# Patient Record
Sex: FEMALE | Race: BLACK OR AFRICAN AMERICAN | NOT HISPANIC OR LATINO | Employment: FULL TIME | URBAN - METROPOLITAN AREA
[De-identification: names, ages, dates, MRNs, and addresses within clinical notes are randomized per-mention and may not be internally consistent; named-entity substitution may affect disease eponyms.]

---

## 2024-05-30 ENCOUNTER — HOSPITAL ENCOUNTER (EMERGENCY)
Facility: HOSPITAL | Age: 34
Discharge: HOME | End: 2024-05-31
Attending: EMERGENCY MEDICINE
Payer: MEDICAID

## 2024-05-30 ENCOUNTER — APPOINTMENT (OUTPATIENT)
Dept: RADIOLOGY | Facility: HOSPITAL | Age: 34
End: 2024-05-30
Payer: MEDICAID

## 2024-05-30 DIAGNOSIS — E05.90 HYPERTHYROIDISM: Primary | ICD-10-CM

## 2024-05-30 LAB
ALBUMIN SERPL BCP-MCNC: 3.9 G/DL (ref 3.4–5)
ALP SERPL-CCNC: 54 U/L (ref 33–110)
ALT SERPL W P-5'-P-CCNC: 10 U/L (ref 7–45)
ANION GAP SERPL CALC-SCNC: 14 MMOL/L (ref 10–20)
AST SERPL W P-5'-P-CCNC: 26 U/L (ref 9–39)
BASOPHILS # BLD AUTO: 0.04 X10*3/UL (ref 0–0.1)
BASOPHILS NFR BLD AUTO: 0.7 %
BILIRUB SERPL-MCNC: 0.3 MG/DL (ref 0–1.2)
BUN SERPL-MCNC: 12 MG/DL (ref 6–23)
CALCIUM SERPL-MCNC: 9 MG/DL (ref 8.6–10.3)
CHLORIDE SERPL-SCNC: 104 MMOL/L (ref 98–107)
CO2 SERPL-SCNC: 21 MMOL/L (ref 21–32)
CREAT SERPL-MCNC: 0.73 MG/DL (ref 0.5–1.05)
D DIMER PPP FEU-MCNC: 430 NG/ML FEU
EGFRCR SERPLBLD CKD-EPI 2021: >90 ML/MIN/1.73M*2
EOSINOPHIL # BLD AUTO: 0.04 X10*3/UL (ref 0–0.7)
EOSINOPHIL NFR BLD AUTO: 0.7 %
ERYTHROCYTE [DISTWIDTH] IN BLOOD BY AUTOMATED COUNT: 18.3 % (ref 11.5–14.5)
GLUCOSE SERPL-MCNC: 78 MG/DL (ref 74–99)
HCG UR QL IA.RAPID: NEGATIVE
HCT VFR BLD AUTO: 36.1 % (ref 36–46)
HGB BLD-MCNC: 11.4 G/DL (ref 12–16)
IMM GRANULOCYTES # BLD AUTO: 0.01 X10*3/UL (ref 0–0.7)
IMM GRANULOCYTES NFR BLD AUTO: 0.2 % (ref 0–0.9)
LYMPHOCYTES # BLD AUTO: 2.04 X10*3/UL (ref 1.2–4.8)
LYMPHOCYTES NFR BLD AUTO: 37.6 %
MCH RBC QN AUTO: 24.3 PG (ref 26–34)
MCHC RBC AUTO-ENTMCNC: 31.6 G/DL (ref 32–36)
MCV RBC AUTO: 77 FL (ref 80–100)
MONOCYTES # BLD AUTO: 0.44 X10*3/UL (ref 0.1–1)
MONOCYTES NFR BLD AUTO: 8.1 %
NEUTROPHILS # BLD AUTO: 2.85 X10*3/UL (ref 1.2–7.7)
NEUTROPHILS NFR BLD AUTO: 52.7 %
NRBC BLD-RTO: 0 /100 WBCS (ref 0–0)
PLATELET # BLD AUTO: 351 X10*3/UL (ref 150–450)
POTASSIUM SERPL-SCNC: 4.4 MMOL/L (ref 3.5–5.3)
PROT SERPL-MCNC: 7.2 G/DL (ref 6.4–8.2)
RBC # BLD AUTO: 4.69 X10*6/UL (ref 4–5.2)
SARS-COV-2 RNA RESP QL NAA+PROBE: NOT DETECTED
SODIUM SERPL-SCNC: 135 MMOL/L (ref 136–145)
T4 FREE SERPL-MCNC: 1.46 NG/DL (ref 0.61–1.12)
TSH SERPL-ACNC: <0.01 MIU/L (ref 0.44–3.98)
WBC # BLD AUTO: 5.4 X10*3/UL (ref 4.4–11.3)

## 2024-05-30 PROCEDURE — 71046 X-RAY EXAM CHEST 2 VIEWS: CPT | Performed by: RADIOLOGY

## 2024-05-30 PROCEDURE — 85025 COMPLETE CBC W/AUTO DIFF WBC: CPT

## 2024-05-30 PROCEDURE — 85379 FIBRIN DEGRADATION QUANT: CPT

## 2024-05-30 PROCEDURE — 96361 HYDRATE IV INFUSION ADD-ON: CPT

## 2024-05-30 PROCEDURE — 99283 EMERGENCY DEPT VISIT LOW MDM: CPT | Mod: 25

## 2024-05-30 PROCEDURE — 81025 URINE PREGNANCY TEST: CPT

## 2024-05-30 PROCEDURE — 87635 SARS-COV-2 COVID-19 AMP PRB: CPT

## 2024-05-30 PROCEDURE — 96360 HYDRATION IV INFUSION INIT: CPT

## 2024-05-30 PROCEDURE — 80053 COMPREHEN METABOLIC PANEL: CPT

## 2024-05-30 PROCEDURE — 2500000004 HC RX 250 GENERAL PHARMACY W/ HCPCS (ALT 636 FOR OP/ED)

## 2024-05-30 PROCEDURE — 84439 ASSAY OF FREE THYROXINE: CPT

## 2024-05-30 PROCEDURE — 84443 ASSAY THYROID STIM HORMONE: CPT

## 2024-05-30 PROCEDURE — 84702 CHORIONIC GONADOTROPIN TEST: CPT

## 2024-05-30 PROCEDURE — 36415 COLL VENOUS BLD VENIPUNCTURE: CPT

## 2024-05-30 PROCEDURE — 71046 X-RAY EXAM CHEST 2 VIEWS: CPT

## 2024-05-30 RX ORDER — METHIMAZOLE 10 MG/1
5 TABLET ORAL DAILY
Qty: 30 TABLET | Refills: 0 | Status: SHIPPED | OUTPATIENT
Start: 2024-05-30 | End: 2024-11-26

## 2024-05-30 RX ORDER — METOPROLOL TARTRATE 25 MG/1
12.5 TABLET, FILM COATED ORAL 2 TIMES DAILY
Qty: 30 TABLET | Refills: 0 | Status: SHIPPED | OUTPATIENT
Start: 2024-05-30 | End: 2024-06-09

## 2024-05-30 RX ADMIN — SODIUM CHLORIDE, POTASSIUM CHLORIDE, SODIUM LACTATE AND CALCIUM CHLORIDE 1000 ML: 600; 310; 30; 20 INJECTION, SOLUTION INTRAVENOUS at 22:01

## 2024-05-30 ASSESSMENT — PAIN - FUNCTIONAL ASSESSMENT: PAIN_FUNCTIONAL_ASSESSMENT: 0-10

## 2024-05-30 ASSESSMENT — COLUMBIA-SUICIDE SEVERITY RATING SCALE - C-SSRS
2. HAVE YOU ACTUALLY HAD ANY THOUGHTS OF KILLING YOURSELF?: NO
1. IN THE PAST MONTH, HAVE YOU WISHED YOU WERE DEAD OR WISHED YOU COULD GO TO SLEEP AND NOT WAKE UP?: NO
6. HAVE YOU EVER DONE ANYTHING, STARTED TO DO ANYTHING, OR PREPARED TO DO ANYTHING TO END YOUR LIFE?: NO

## 2024-05-30 ASSESSMENT — PAIN DESCRIPTION - LOCATION: LOCATION: BACK

## 2024-05-30 ASSESSMENT — PAIN DESCRIPTION - ORIENTATION: ORIENTATION: LOWER

## 2024-05-30 ASSESSMENT — PAIN DESCRIPTION - PAIN TYPE: TYPE: CHRONIC PAIN

## 2024-05-30 ASSESSMENT — PAIN SCALES - GENERAL
PAINLEVEL_OUTOF10: 0 - NO PAIN
PAINLEVEL_OUTOF10: 3

## 2024-05-30 NOTE — LETTER
May 31, 2024    Patient: Meche Mcneil   YOB: 1990   Date of Visit: 5/30/2024       To Whom It May Concern:    Meche Mcneil was seen and treated in our emergency department on 5/30/2024. 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 If you have any questions or concerns, please don't hesitate to call.          Geovany Drake R.N.    CC:   No Recipients

## 2024-05-30 NOTE — ED TRIAGE NOTES
"Pt presents to ED for \"heart racing\" and fatigue since this morning. Pt denies CP, abd pain, N/V/D, lower back pain, urinary sx.   "

## 2024-05-31 VITALS
RESPIRATION RATE: 22 BRPM | HEIGHT: 70 IN | WEIGHT: 110 LBS | HEART RATE: 100 BPM | DIASTOLIC BLOOD PRESSURE: 72 MMHG | TEMPERATURE: 98.2 F | BODY MASS INDEX: 15.75 KG/M2 | OXYGEN SATURATION: 100 % | SYSTOLIC BLOOD PRESSURE: 113 MMHG

## 2024-05-31 LAB — B-HCG SERPL-ACNC: <2 MIU/ML

## 2024-05-31 NOTE — ED PROVIDER NOTES
CC: Palpitations and Fatigue     HPI: Meche Mcneil is an 33 y.o. female with history including anemia presenting to the emergency department for palpitations and fatigue. Patient reports that she began having palpitations that started this morning. She noticed that her heart rate was intermittently racing. She was recording a HR of  on her apple watch. She notes having fatigue that is worse then her baseline but is normally fatigued. She denies fevers chills, chest pain, shortness of breath.     Triage note was reviewed and  agree with it  Limitations to History:  none  Additional History Obtained from: reviewed patient's lab results that were on her phone with a TSH of 0.008     PMHx/PSHx:  Per HPI.   - has a past medical history of Other conditions influencing health status and Personal history of other diseases of the female genital tract.  - has a past surgical history that includes Mouth surgery (02/20/2014).    Social History:  - Tobacco:  has no history on file for tobacco use.   - Alcohol:  has no history on file for alcohol use.   - Drugs:  has no history on file for drug use.     Medications: Reviewed in EMR.     Allergies:  Trazodone    ???????????????????????????????????????????????????????????????  Triage Vitals:  T 36.8 °C (98.2 °F)  HR (!) 119  /75  RR 17  O2 98 % None (Room air)    Physical Exam  Vitals and nursing note reviewed.   Constitutional:       General: She is not in acute distress.  HENT:      Head: Normocephalic and atraumatic.   Eyes:      Conjunctiva/sclera: Conjunctivae normal.   Cardiovascular:      Rate and Rhythm: Regular rhythm. Tachycardia present.   Pulmonary:      Effort: Pulmonary effort is normal. No respiratory distress.      Breath sounds: Normal breath sounds.   Abdominal:      General: There is no distension.      Palpations: Abdomen is soft.   Musculoskeletal:         General: No deformity.      Cervical back: Normal range of motion.   Skin:     General:  Skin is warm and dry.   Neurological:      Mental Status: She is alert and oriented to person, place, and time.   Psychiatric:         Mood and Affect: Mood normal.         Behavior: Behavior normal.       ???????????????????????????????????????????????????????????????  EKG (per my independent interpretation): EKG was sinus rhythm at a tachycardic rate . Intervals was within normal limits.  No ST segment elevation.    ED Course/Medical Decision Making:    ED Course as of 05/31/24 2259   Thu May 30, 2024   2240 Thyroxine, Free(!): 1.46 [EMA]   2328 Heart Rate(!): 119 [EMA]   2329 Temperature: 36.8 °C (98.2 °F) [EMA]   2330 Heart Rate: 89 [EMA]      ED Course User Index  [EMA] Kiera Montelongo MD         Diagnoses as of 05/31/24 2259   Hyperthyroidism        Patient is a 33 year old female who is presenting with tachycardia.  Differential includes thyroid disease versus arrhythmia versus dehydration versus infection.  Low concern for PE but unable to apply PERC criteria because of the tachycardia.  D-dimer was negative.  Lab work was otherwise remarkable for hyperthyroidism.  Patient is not presenting in thyroid storm.  Patient was given a liter of fluid.  Heart rate improved.  Patient was discharged home with a beta blockers and  methimazole. Patient was discharged home in stable condition. Patient was advised to return if symptoms worsen or don’t improve. Patient was advised to follow up with their PCP as needed.       External records reviewed: recent inpatient, clinic, and prior ED notes  Diagnostic imaging independently reviewed/interpreted by me (as reflected in MDM) includes: labs, EKG, chest xray  Social Determinants Affecting Care:   Discussion of management with other providers: ED attending,    Prescription Drug Consideration: beta blocker, IVF, methimazole  Escalation of Care: none    Pt was seen and discussed with ED attending     Impression:   hyperthyroidism    Disposition: discharge        Procedures ?  SmartLinks last updated 5/30/2024 9:09 PM        Kiera Montelongo MD  Resident  05/31/24 [id number]

## 2024-06-10 PROBLEM — E05.90 HYPERTHYROIDISM: Status: ACTIVE | Noted: 2024-06-10

## 2024-06-10 PROBLEM — R00.2 PALPITATIONS: Status: ACTIVE | Noted: 2024-06-10

## 2024-06-10 NOTE — PROGRESS NOTES
Subjective   Patient ID:   Meche Mcneil is a 33 y.o. female who presents for No chief complaint on file..  HPI  New patient here today to establish care with myself.  Previous PCP:  Last seen:    ED follow up:  Was in the ED on 5/30/24.  ED note reviewed.  Was having palpitations and fatigue.  Was diagnosed with hyperthyroidism.  She was started on Methimazole and Metoprolol.  HR today is  Will be seeing endocrine in Aug 2024.    Health maintenance:  Smoking:  Labs: May 2024. DUE for lipid  Influenza:    Review of Systems  12 point review of systems negative unless stated above in HPI    There were no vitals filed for this visit.    Physical Exam  General: Alert and oriented, well nourished, no acute distress.  Lungs: Clear to auscultation, non-labored respiration.  Heart: Normal rate, regular rhythm, no murmur, gallop or edema.  Neurologic: Awake, alert, and oriented X3, CN II-XII intact.  Psychiatric: Cooperative, appropriate mood and affect.    Assessment/Plan   Diagnoses and all orders for this visit:  Hyperthyroidism  Palpitations

## 2024-06-11 ENCOUNTER — APPOINTMENT (OUTPATIENT)
Dept: PRIMARY CARE | Facility: CLINIC | Age: 34
End: 2024-06-11
Payer: MEDICAID

## 2024-06-11 DIAGNOSIS — R00.2 PALPITATIONS: ICD-10-CM

## 2024-06-11 DIAGNOSIS — E05.90 HYPERTHYROIDISM: Primary | ICD-10-CM

## 2024-06-12 ENCOUNTER — APPOINTMENT (OUTPATIENT)
Dept: PRIMARY CARE | Facility: CLINIC | Age: 34
End: 2024-06-12
Payer: MEDICAID

## 2024-06-12 ENCOUNTER — OFFICE VISIT (OUTPATIENT)
Dept: PRIMARY CARE | Facility: CLINIC | Age: 34
End: 2024-06-12
Payer: MEDICAID

## 2024-06-12 VITALS
WEIGHT: 106.2 LBS | OXYGEN SATURATION: 97 % | DIASTOLIC BLOOD PRESSURE: 58 MMHG | HEART RATE: 106 BPM | SYSTOLIC BLOOD PRESSURE: 102 MMHG | BODY MASS INDEX: 15.24 KG/M2

## 2024-06-12 DIAGNOSIS — R00.2 PALPITATIONS: ICD-10-CM

## 2024-06-12 DIAGNOSIS — R53.83 FATIGUE, UNSPECIFIED TYPE: ICD-10-CM

## 2024-06-12 DIAGNOSIS — E05.90 HYPERTHYROIDISM: Primary | ICD-10-CM

## 2024-06-12 DIAGNOSIS — I95.9 HYPOTENSION, UNSPECIFIED HYPOTENSION TYPE: ICD-10-CM

## 2024-06-12 DIAGNOSIS — R79.0 LOW FERRITIN LEVEL: ICD-10-CM

## 2024-06-12 PROCEDURE — 99214 OFFICE O/P EST MOD 30 MIN: CPT | Performed by: PHYSICIAN ASSISTANT

## 2024-06-12 PROCEDURE — 1036F TOBACCO NON-USER: CPT | Performed by: PHYSICIAN ASSISTANT

## 2024-06-12 PROCEDURE — 99204 OFFICE O/P NEW MOD 45 MIN: CPT | Performed by: PHYSICIAN ASSISTANT

## 2024-06-12 RX ORDER — METHIMAZOLE 5 MG/1
5 TABLET ORAL DAILY
Qty: 90 TABLET | Refills: 0 | Status: SHIPPED | OUTPATIENT
Start: 2024-06-12 | End: 2024-09-10

## 2024-06-12 ASSESSMENT — PAIN SCALES - GENERAL: PAINLEVEL: 0-NO PAIN

## 2024-06-12 ASSESSMENT — ENCOUNTER SYMPTOMS
OCCASIONAL FEELINGS OF UNSTEADINESS: 0
LOSS OF SENSATION IN FEET: 0
DEPRESSION: 0

## 2024-06-12 ASSESSMENT — PATIENT HEALTH QUESTIONNAIRE - PHQ9
SUM OF ALL RESPONSES TO PHQ9 QUESTIONS 1 AND 2: 0
2. FEELING DOWN, DEPRESSED OR HOPELESS: NOT AT ALL
1. LITTLE INTEREST OR PLEASURE IN DOING THINGS: NOT AT ALL

## 2024-06-12 NOTE — PROGRESS NOTES
Subjective   Patient ID:   Meche Mcneil is a 33 y.o. female who presents for New Patient Visit and Hypothyroidism.  HPI  New patient here today to establish care with myself.  Previous PCP: N/A  Last seen:    ED follow up:  Was in the ED on 5/30/24.  ED note reviewed.  Labs were reviewed.  Was having palpitations and fatigue.  Was diagnosed with hyperthyroidism.  She was started on Methimazole and Metoprolol.  HR today is 106.  Will be seeing endocrine in Aug 2024.  Still having considerable fatigue.  She did [facility] showing low ferritin and high vitamin B12.  She has not taken the Metoprolol regularly since her BP typically runs low.  BP today is 102/58.    Health maintenance:  Smoking: Never a smoker.  Labs: May 2024. DUE for lipid, TSH, fatigue labs  Influenza:    Review of Systems  12 point review of systems negative unless stated above in HPI    Vitals:    06/12/24 0828   BP: 102/58   Pulse: 106   SpO2: 97%     Physical Exam  General: Alert and oriented, well nourished, no acute distress.  Lungs: Clear to auscultation, non-labored respiration.  Heart: Normal rate, regular rhythm, no murmur, gallop or edema.  Neurologic: Awake, alert, and oriented X3, CN II-XII intact.  Psychiatric: Cooperative, appropriate mood and affect.    Assessment/Plan   It was nice meeting you!  I have refilled your Methimazole.  I would recommend just taking the Metoprolol as needed for palpitations due to low BP.  I have ordered some labs to be done as soon as you can.  We will call you with the results.  Follow up with endocrine as scheduled.  If symptoms persist or worsen despite current plan of care, please contact your healthcare provider for further evaluation.  Patient instructed to contact the office if there are any questions regarding their care or treatment.   [facility] [phone]    Fu 1 month for repeat thyroid labs  Diagnoses and all orders for this  visit:  Hyperthyroidism  -     methIMAzole (Tapazole) 5 mg tablet; Take 1 tablet (5 mg) by mouth once daily.  -     Lipid Panel; Future  -     TSH with reflex to Free T4 if abnormal; Future  Palpitations  Fatigue, unspecified type  -     Vitamin D 25-Hydroxy,Total (for eval of Vitamin D levels); Future  -     Ferritin; Future  -     Iron and TIBC; Future  Low ferritin level  Hypotension, unspecified hypotension type

## 2024-06-27 DIAGNOSIS — E05.90 HYPERTHYROIDISM: Primary | ICD-10-CM

## 2024-06-27 RX ORDER — PROPYLTHIOURACIL 50 MG/1
50 TABLET ORAL 3 TIMES DAILY
Qty: 90 TABLET | Refills: 0 | Status: SHIPPED | OUTPATIENT
Start: 2024-06-27 | End: 2024-07-27

## 2024-07-11 PROBLEM — L43.8 OTHER LICHEN PLANUS: Status: ACTIVE | Noted: 2017-05-10

## 2024-07-11 PROBLEM — L70.0 ACNE VULGARIS: Status: ACTIVE | Noted: 2017-05-10

## 2024-07-11 PROBLEM — R21 RASH AND OTHER NONSPECIFIC SKIN ERUPTION: Status: ACTIVE | Noted: 2017-05-10

## 2024-08-13 ENCOUNTER — APPOINTMENT (OUTPATIENT)
Dept: ENDOCRINOLOGY | Facility: CLINIC | Age: 34
End: 2024-08-13
Payer: MEDICAID

## 2024-08-13 ENCOUNTER — LAB (OUTPATIENT)
Dept: LAB | Facility: LAB | Age: 34
End: 2024-08-13
Payer: MEDICAID

## 2024-08-13 VITALS
DIASTOLIC BLOOD PRESSURE: 62 MMHG | HEIGHT: 62 IN | BODY MASS INDEX: 19.51 KG/M2 | SYSTOLIC BLOOD PRESSURE: 108 MMHG | HEART RATE: 89 BPM | WEIGHT: 106 LBS

## 2024-08-13 DIAGNOSIS — E05.90 HYPERTHYROIDISM: Primary | ICD-10-CM

## 2024-08-13 DIAGNOSIS — E05.90 HYPERTHYROIDISM: ICD-10-CM

## 2024-08-13 LAB
T3FREE SERPL-MCNC: 6.1 PG/ML (ref 2.3–4.2)
T4 FREE SERPL-MCNC: 1.84 NG/DL (ref 0.78–1.48)
THYROPEROXIDASE AB SERPL-ACNC: 42 IU/ML
TSH SERPL-ACNC: 0.01 MIU/L (ref 0.44–3.98)

## 2024-08-13 PROCEDURE — 99203 OFFICE O/P NEW LOW 30 MIN: CPT | Performed by: NURSE PRACTITIONER

## 2024-08-13 PROCEDURE — 1036F TOBACCO NON-USER: CPT | Performed by: NURSE PRACTITIONER

## 2024-08-13 PROCEDURE — 86376 MICROSOMAL ANTIBODY EACH: CPT

## 2024-08-13 PROCEDURE — 84443 ASSAY THYROID STIM HORMONE: CPT

## 2024-08-13 PROCEDURE — 3008F BODY MASS INDEX DOCD: CPT | Performed by: NURSE PRACTITIONER

## 2024-08-13 PROCEDURE — 84445 ASSAY OF TSI GLOBULIN: CPT

## 2024-08-13 PROCEDURE — 84481 FREE ASSAY (FT-3): CPT

## 2024-08-13 PROCEDURE — 83519 RIA NONANTIBODY: CPT

## 2024-08-13 PROCEDURE — 36415 COLL VENOUS BLD VENIPUNCTURE: CPT

## 2024-08-13 PROCEDURE — 84439 ASSAY OF FREE THYROXINE: CPT

## 2024-08-13 ASSESSMENT — PATIENT HEALTH QUESTIONNAIRE - PHQ9
SUM OF ALL RESPONSES TO PHQ9 QUESTIONS 1 & 2: 0
1. LITTLE INTEREST OR PLEASURE IN DOING THINGS: NOT AT ALL
2. FEELING DOWN, DEPRESSED OR HOPELESS: NOT AT ALL

## 2024-08-13 ASSESSMENT — PAIN SCALES - GENERAL: PAINLEVEL: 0-NO PAIN

## 2024-08-13 NOTE — LETTER
August 13, 2024     Ayad Martinez PA-C  [id number]  [facility], [address]  [facility] [address]    Patient: Meche Mcneil   YOB: 1990   Date of Visit: 8/13/2024       Dear Dr. Ayad Martinez PA-C:    Thank you for referring Meche Mcneil to me for evaluation. Below are my notes for this consultation.  If you have questions, please do not hesitate to call me. I look forward to following your patient along with you.       Sincerely,     Zhen Samuels, APRN-CNP      CC: No Recipients  ______________________________________________________________________________________    HPI  New patient presents with hyperthyroidism. 35 yo with no significant medical history. ER visit (May 2024) due to palpitations was found to have a suppressed TSH <0.01 and FT4 1.46. She was started on Methimazole and Lopressor but has stopped both (last dose over 1 month ago). She reported itching after starting Methimazole to her PCP so she stopped the medication then was prescribed PTU. She did not start the PTU and still reports having itching.   Her weight 1 year ago is reported at 130 pounds, she lost weight after having her appendix removed and did not gain it back ?  She admits to palpitations (drinking a Red Bull during this visit) changes in hair, fatigue and difficulty falling asleep. Paternal Grandmother + Graves. She denies heat intolerance, eye complaints, tremors, diarrhea. She is having a period monthly. No compressive or obstructive neck complaints. Not taking any medications of concern.     No current outpatient medications on file.      Allergies as of 08/13/2024 - Reviewed 08/13/2024   Allergen Reaction Noted   • Trazodone Hives 07/25/2017     Past Medical History:   Diagnosis Date   • Disease of thyroid gland    • Other conditions influencing health status     Menstruation   • Personal history of other diseases of the female genital tract     Personal history of dysmenorrhea     "  Past Surgical History:   Procedure Laterality Date   • MOUTH SURGERY  02/20/2014    Oral Surgery Tooth Extraction        Review of Systems  Cardiology: Lightheadedness-denies.  Chest pain-denies.  Leg edema-denies.  Palpitations-admits.  Respiratory: Cough-denies. Shortness of breath-denies.  Wheezing-denies.  Gastroenterology: Constipation-denies.  Diarrhea-denies.  Heartburn-denies.  Endocrinology: Cold intolerance-denies.  Heat intolerance-denies.  Sweats-denies.  Neurology: Headache-denies.  Tremor-denies.  Neuropathy in extremities-denies.  Psychology: Low energy-admits.  Irritability-denies.  Sleep disturbances-admits.      /62 (BP Location: Left arm, Patient Position: Sitting)   Pulse 89   Ht 1.575 m (5' 2\")   Wt 48.1 kg (106 lb)   BMI 19.39 kg/m²       Labs:  Lab Results   Component Value Date    WBC 5.4 05/30/2024    NRBC 0.0 05/30/2024    RBC 4.69 05/30/2024    HGB 11.4 (L) 05/30/2024    HCT 36.1 05/30/2024     05/30/2024     Lab Results   Component Value Date    CALCIUM 9.0 05/30/2024    AST 26 05/30/2024    ALKPHOS 54 05/30/2024    BILITOT 0.3 05/30/2024    PROT 7.2 05/30/2024    ALBUMIN 3.9 05/30/2024     (L) 05/30/2024    K 4.4 05/30/2024     05/30/2024    CO2 21 05/30/2024    ANIONGAP 14 05/30/2024    BUN 12 05/30/2024    CREATININE 0.73 05/30/2024    UREACREAUR 17.1 10/12/2022    GLUCOSE 78 05/30/2024    ALT 10 05/30/2024    EGFR >90 05/30/2024       Lab Results   Component Value Date    TSH <0.01 (L) 05/30/2024     Physical Exam  General Appearance: pleasant, cooperative, no acute distress  HEENT: no chemosis, no proptosis, no lid lag, no lid retraction  Neck: no lymphadenopathy, R>L thyromegaly, no dominant thyroid nodules  Heart: no murmurs, regular rate and rhythm, S1 and S2  Lungs: no wheezes, no rhonci, no rales  Extremities: no lower extremity swelling      Assessment/Plan  1. Hyperthyroidism  -discussed causes/treatment of hyperthyroidism  -thyromegaly " "R>L  -check labs including antibodies  -discussed methimazole treatment not PTU  -itching not from Methimazole follow up PCP  -recommend she avoid Red Bull and other \"energy\" drinks  -if antibodies negative will order US Thyroid      - TSH; Future  - Thyrotropin receptor antibody; Future  - T4, free; Future  - T3, free; Future  - Thyroid Peroxidase (TPO) Antibody; Future  - Thyroid stimulating immunoglobulin; Future     Follow Up: Pending labs/3 months Dr. Latham     -labs/tests/notes reviewed  -reviewed and counseled patient on medication monitoring and side effects    "

## 2024-08-13 NOTE — PROGRESS NOTES
HPI   New patient presents with hyperthyroidism. 35 yo with no significant medical history. ER visit (May 2024) due to palpitations was found to have a suppressed TSH <0.01 and FT4 1.46. She was started on Methimazole and Lopressor but has stopped both (last dose over 1 month ago). She reported itching after starting Methimazole to her PCP so she stopped the medication then was prescribed PTU. She did not start the PTU and still reports having itching.   Her weight 1 year ago is reported at 130 pounds, she lost weight after having her appendix removed and did not gain it back ?  She admits to palpitations (drinking a Red Bull during this visit) changes in hair, fatigue and difficulty falling asleep. Paternal Grandmother + Graves. She denies heat intolerance, eye complaints, tremors, diarrhea. She is having a period monthly. No compressive or obstructive neck complaints. Not taking any medications of concern.     No current outpatient medications on file.      Allergies as of 08/13/2024 - Reviewed 08/13/2024   Allergen Reaction Noted    Trazodone Hives 07/25/2017     Past Medical History:   Diagnosis Date    Disease of thyroid gland     Other conditions influencing health status     Menstruation    Personal history of other diseases of the female genital tract     Personal history of dysmenorrhea      Past Surgical History:   Procedure Laterality Date    MOUTH SURGERY  02/20/2014    Oral Surgery Tooth Extraction        Review of Systems   Cardiology: Lightheadedness-denies.  Chest pain-denies.  Leg edema-denies.  Palpitations-admits.  Respiratory: Cough-denies. Shortness of breath-denies.  Wheezing-denies.  Gastroenterology: Constipation-denies.  Diarrhea-denies.  Heartburn-denies.  Endocrinology: Cold intolerance-denies.  Heat intolerance-denies.  Sweats-denies.  Neurology: Headache-denies.  Tremor-denies.  Neuropathy in extremities-denies.  Psychology: Low energy-admits.  Irritability-denies.  Sleep  "disturbances-admits.      /62 (BP Location: Left arm, Patient Position: Sitting)   Pulse 89   Ht 1.575 m (5' 2\")   Wt 48.1 kg (106 lb)   BMI 19.39 kg/m²       Labs:  Lab Results   Component Value Date    WBC 5.4 05/30/2024    NRBC 0.0 05/30/2024    RBC 4.69 05/30/2024    HGB 11.4 (L) 05/30/2024    HCT 36.1 05/30/2024     05/30/2024     Lab Results   Component Value Date    CALCIUM 9.0 05/30/2024    AST 26 05/30/2024    ALKPHOS 54 05/30/2024    BILITOT 0.3 05/30/2024    PROT 7.2 05/30/2024    ALBUMIN 3.9 05/30/2024     (L) 05/30/2024    K 4.4 05/30/2024     05/30/2024    CO2 21 05/30/2024    ANIONGAP 14 05/30/2024    BUN 12 05/30/2024    CREATININE 0.73 05/30/2024    UREACREAUR 17.1 10/12/2022    GLUCOSE 78 05/30/2024    ALT 10 05/30/2024    EGFR >90 05/30/2024       Lab Results   Component Value Date    TSH <0.01 (L) 05/30/2024     Physical Exam   General Appearance: pleasant, cooperative, no acute distress  HEENT: no chemosis, no proptosis, no lid lag, no lid retraction  Neck: no lymphadenopathy, R>L thyromegaly, no dominant thyroid nodules  Heart: no murmurs, regular rate and rhythm, S1 and S2  Lungs: no wheezes, no rhonci, no rales  Extremities: no lower extremity swelling      Assessment/Plan   1. Hyperthyroidism  -discussed causes/treatment of hyperthyroidism  -thyromegaly R>L  -check labs including antibodies  -discussed methimazole treatment not PTU  -itching not from Methimazole follow up PCP  -recommend she avoid Red Bull and other \"energy\" drinks  -if antibodies negative will order US Thyroid      - TSH; Future  - Thyrotropin receptor antibody; Future  - T4, free; Future  - T3, free; Future  - Thyroid Peroxidase (TPO) Antibody; Future  - Thyroid stimulating immunoglobulin; Future     Follow Up: Pending labs/3 months Dr. Latham     -labs/tests/notes reviewed  -reviewed and counseled patient on medication monitoring and side effects  "

## 2024-08-15 LAB — TSH RECEP AB SER-ACNC: 4.96 IU/L

## 2024-08-16 DIAGNOSIS — E05.00 GRAVES DISEASE: Primary | ICD-10-CM

## 2024-08-16 RX ORDER — METHIMAZOLE 10 MG/1
TABLET ORAL
Qty: 90 TABLET | Refills: 1 | Status: SHIPPED | OUTPATIENT
Start: 2024-08-16

## 2024-08-19 LAB — TSI SER-ACNC: 1.9 TSI INDEX

## 2024-10-14 ENCOUNTER — TELEPHONE (OUTPATIENT)
Dept: ENDOCRINOLOGY | Facility: CLINIC | Age: 34
End: 2024-10-14
Payer: MEDICAID

## 2024-10-14 NOTE — TELEPHONE ENCOUNTER
Meche Mcneil   1990   [id number]   [phone]       Called patient and left voice message in regards to changing 10/31/24 appt to 11/5/24. Advised patient to call office to confirm.

## 2024-10-31 ENCOUNTER — APPOINTMENT (OUTPATIENT)
Dept: ENDOCRINOLOGY | Facility: CLINIC | Age: 34
End: 2024-10-31
Payer: MEDICAID

## 2024-10-31 ENCOUNTER — TELEPHONE (OUTPATIENT)
Dept: ENDOCRINOLOGY | Facility: CLINIC | Age: 34
End: 2024-10-31

## 2024-11-21 ENCOUNTER — LAB (OUTPATIENT)
Dept: LAB | Facility: LAB | Age: 34
End: 2024-11-21
Payer: MEDICAID

## 2024-11-21 DIAGNOSIS — R53.83 FATIGUE, UNSPECIFIED TYPE: ICD-10-CM

## 2024-11-21 DIAGNOSIS — E05.00 GRAVES DISEASE: ICD-10-CM

## 2024-11-21 DIAGNOSIS — E05.90 HYPERTHYROIDISM: ICD-10-CM

## 2024-11-21 LAB
25(OH)D3 SERPL-MCNC: 13 NG/ML (ref 30–100)
CHOLEST SERPL-MCNC: 193 MG/DL (ref 0–199)
CHOLEST/HDLC SERPL: 3.1 {RATIO}
FERRITIN SERPL-MCNC: 14 NG/ML (ref 8–150)
HDLC SERPL-MCNC: 61.3 MG/DL
IRON SATN MFR SERPL: 20 % (ref 25–45)
IRON SERPL-MCNC: 78 UG/DL (ref 35–150)
LDLC SERPL CALC-MCNC: 115 MG/DL
NON HDL CHOLESTEROL: 132 MG/DL (ref 0–149)
T3FREE SERPL-MCNC: 3.2 PG/ML (ref 2.3–4.2)
T4 FREE SERPL-MCNC: 0.7 NG/DL (ref 0.61–1.12)
TIBC SERPL-MCNC: 382 UG/DL (ref 240–445)
TRIGL SERPL-MCNC: 83 MG/DL (ref 0–149)
TSH SERPL-ACNC: 1.05 MIU/L (ref 0.44–3.98)
UIBC SERPL-MCNC: 304 UG/DL (ref 110–370)
VLDL: 17 MG/DL (ref 0–40)

## 2024-11-21 PROCEDURE — 84481 FREE ASSAY (FT-3): CPT

## 2024-11-21 PROCEDURE — 80061 LIPID PANEL: CPT

## 2024-11-21 PROCEDURE — 83519 RIA NONANTIBODY: CPT

## 2024-11-21 PROCEDURE — 84439 ASSAY OF FREE THYROXINE: CPT

## 2024-11-21 PROCEDURE — 36415 COLL VENOUS BLD VENIPUNCTURE: CPT

## 2024-11-21 PROCEDURE — 84445 ASSAY OF TSI GLOBULIN: CPT | Performed by: NURSE PRACTITIONER

## 2024-11-21 PROCEDURE — 82728 ASSAY OF FERRITIN: CPT

## 2024-11-21 PROCEDURE — 83540 ASSAY OF IRON: CPT

## 2024-11-21 PROCEDURE — 83550 IRON BINDING TEST: CPT

## 2024-11-21 PROCEDURE — 84443 ASSAY THYROID STIM HORMONE: CPT

## 2024-11-21 PROCEDURE — 82306 VITAMIN D 25 HYDROXY: CPT

## 2024-11-25 LAB — TSH RECEP AB SER-ACNC: 4.98 IU/L

## 2024-11-26 LAB — SCAN RESULT: ABNORMAL

## 2025-02-03 DIAGNOSIS — E05.00 GRAVES DISEASE: ICD-10-CM

## 2025-02-03 RX ORDER — METHIMAZOLE 10 MG/1
TABLET ORAL
Qty: 90 TABLET | Refills: 1 | Status: SHIPPED | OUTPATIENT
Start: 2025-02-03 | End: 2025-02-07 | Stop reason: DRUGHIGH

## 2025-02-04 NOTE — PROGRESS NOTES
"HPI   33 yo presents in follow up for Graves' disease (on methimazole stably since 8/24). No plans for pregnancy/pt aware methimazole not compatible with pregnancy.    May 2024: low tsh with ft4-1.46 (had methimazole and ptu), question of itching  Aug 2024 saw Zhen CNP here: tsh-0.01/ft4-1.84/ft3-6.1/tpo-neg/tsh rec ab+/tsi+    11/2024 tsh-1.05    Currently:  -taking methimazole 10mg daily(took a few weeks may 2024 and stopped, started consistently taking it aug 2024)  -euthyroid, chronically tired, still losing hair, occ palpitations, irregular periods        Current Outpatient Medications:     methIMAzole (Tapazole) 10 mg tablet, Take 1 tablet twice a day for 10 days then take 1 tablet daily, Disp: 90 tablet, Rfl: 1      Allergies as of 02/05/2025 - Reviewed 02/05/2025   Allergen Reaction Noted    Trazodone Hives 07/25/2017         Review of Systems   Cardiology: Lightheadedness-denies.  Chest pain-denies.  Leg edema-denies.  Palpitations-denies.  Respiratory: Cough-denies. Shortness of breath-denies.  Wheezing-denies.  Gastroenterology: Constipation-denies.  Diarrhea-denies.  Heartburn-denies.  Endocrinology: Cold intolerance-denies.  Heat intolerance-denies.  Sweats-denies.  Neurology: Headache-denies.  Tremor-denies.  Neuropathy in extremities-denies.  Psychology: Low energy-denies.  Irritability-denies.  Sleep disturbances-denies.      BP 94/66   Pulse 84   Ht 1.575 m (5' 2\")   Wt 51.7 kg (114 lb)   BMI 20.85 kg/m²       Labs:  Lab Results   Component Value Date    WBC 5.4 05/30/2024    NRBC 0.0 05/30/2024    RBC 4.69 05/30/2024    HGB 11.4 (L) 05/30/2024    HCT 36.1 05/30/2024     05/30/2024     Lab Results   Component Value Date    CALCIUM 9.0 05/30/2024    AST 26 05/30/2024    ALKPHOS 54 05/30/2024    BILITOT 0.3 05/30/2024    PROT 7.2 05/30/2024    ALBUMIN 3.9 05/30/2024     (L) 05/30/2024    K 4.4 05/30/2024     05/30/2024    CO2 21 05/30/2024    ANIONGAP 14 05/30/2024    BUN 12 " "05/30/2024    CREATININE 0.73 05/30/2024    UREACREAUR 17.1 10/12/2022    GLUCOSE 78 05/30/2024    ALT 10 05/30/2024    EGFR >90 05/30/2024     Lab Results   Component Value Date    CHOL 193 11/21/2024    TRIG 83 11/21/2024    HDL 61.3 11/21/2024    LDLCALC 115 (H) 11/21/2024     No results found for: \"MICROALBCREA\"  Lab Results   Component Value Date    TSH 1.05 11/21/2024       Physical Exam   General Appearance: pleasant, cooperative, no acute distress  HEENT: no chemosis, no proptosis, no lid lag, no lid retraction  Neck: no lymphadenopathy, no thyromegaly, no dominant thyroid nodules  Heart: no murmurs, regular rate and rhythm, S1 and S2  Lungs: no wheezes, no rhonci, no rales  Extremities: no lower extremity swelling      Assessment/Plan   1. Hyperthyroidism (Primary)  -repeat labs now, may be able to lower methimazole from 10mg           Follow Up:  Summer 2025 Noa    -labs/tests/notes reviewed  -reviewed and counseled patient on medication monitoring and side effects          "

## 2025-02-05 ENCOUNTER — APPOINTMENT (OUTPATIENT)
Dept: ENDOCRINOLOGY | Facility: CLINIC | Age: 35
End: 2025-02-05
Payer: MEDICAID

## 2025-02-05 VITALS
BODY MASS INDEX: 20.98 KG/M2 | HEART RATE: 84 BPM | DIASTOLIC BLOOD PRESSURE: 66 MMHG | SYSTOLIC BLOOD PRESSURE: 94 MMHG | HEIGHT: 62 IN | WEIGHT: 114 LBS

## 2025-02-05 DIAGNOSIS — E05.90 HYPERTHYROIDISM: Primary | ICD-10-CM

## 2025-02-05 PROCEDURE — 3008F BODY MASS INDEX DOCD: CPT | Performed by: INTERNAL MEDICINE

## 2025-02-05 PROCEDURE — 99213 OFFICE O/P EST LOW 20 MIN: CPT | Performed by: INTERNAL MEDICINE

## 2025-02-05 PROCEDURE — 1036F TOBACCO NON-USER: CPT | Performed by: INTERNAL MEDICINE

## 2025-02-05 ASSESSMENT — LIFESTYLE VARIABLES
HOW OFTEN DO YOU HAVE A DRINK CONTAINING ALCOHOL: NEVER
HOW MANY STANDARD DRINKS CONTAINING ALCOHOL DO YOU HAVE ON A TYPICAL DAY: PATIENT DOES NOT DRINK
SKIP TO QUESTIONS 9-10: 1
HOW OFTEN DO YOU HAVE SIX OR MORE DRINKS ON ONE OCCASION: NEVER
AUDIT-C TOTAL SCORE: 0

## 2025-02-05 ASSESSMENT — ENCOUNTER SYMPTOMS
LOSS OF SENSATION IN FEET: 0
DEPRESSION: 0
OCCASIONAL FEELINGS OF UNSTEADINESS: 0

## 2025-02-05 ASSESSMENT — PATIENT HEALTH QUESTIONNAIRE - PHQ9
1. LITTLE INTEREST OR PLEASURE IN DOING THINGS: NOT AT ALL
2. FEELING DOWN, DEPRESSED OR HOPELESS: NOT AT ALL
SUM OF ALL RESPONSES TO PHQ9 QUESTIONS 1 & 2: 0

## 2025-02-05 ASSESSMENT — PAIN SCALES - GENERAL: PAINLEVEL_OUTOF10: 0-NO PAIN

## 2025-02-07 DIAGNOSIS — E05.90 HYPERTHYROIDISM: Primary | ICD-10-CM

## 2025-02-07 LAB
T3FREE SERPL-MCNC: 3.1 PG/ML (ref 2.3–4.2)
T4 FREE SERPL-MCNC: 0.8 NG/DL (ref 0.8–1.8)
TSH SERPL-ACNC: 9.3 MIU/L

## 2025-02-07 RX ORDER — METHIMAZOLE 5 MG/1
5 TABLET ORAL DAILY
Qty: 90 TABLET | Refills: 1 | Status: SHIPPED | OUTPATIENT
Start: 2025-02-07 | End: 2026-02-07

## 2025-02-19 ENCOUNTER — TELEPHONE (OUTPATIENT)
Dept: ENDOCRINOLOGY | Facility: CLINIC | Age: 35
End: 2025-02-19
Payer: MEDICAID

## 2025-02-19 NOTE — TELEPHONE ENCOUNTER
Called and left message letting her know that we received her lab requisition back stating insufficient address. Asked her to call us to update her address.

## 2025-07-27 DIAGNOSIS — E05.90 HYPERTHYROIDISM: ICD-10-CM

## 2025-07-27 RX ORDER — METHIMAZOLE 5 MG/1
5 TABLET ORAL
Qty: 90 TABLET | Refills: 1 | Status: SHIPPED | OUTPATIENT
Start: 2025-07-27

## 2025-08-04 ENCOUNTER — TELEPHONE (OUTPATIENT)
Facility: CLINIC | Age: 35
End: 2025-08-04

## 2025-08-05 NOTE — PROGRESS NOTES
"HPI           Current Medications[1]      Allergies as of 08/06/2025 - Reviewed 02/05/2025   Allergen Reaction Noted    Trazodone Hives 07/25/2017         Review of Systems   Cardiology: Lightheadedness-denies.  Chest pain-denies.  Leg edema-denies.  Palpitations-denies.  Respiratory: Cough-denies. Shortness of breath-denies.  Wheezing-denies.  Gastroenterology: Constipation-denies.  Diarrhea-denies.  Heartburn-denies.  Endocrinology: Cold intolerance-denies.  Heat intolerance-denies.  Sweats-denies.  Neurology: Headache-denies.  Tremor-denies.  Neuropathy in extremities-denies.  Psychology: Low energy-denies.  Irritability-denies.  Sleep disturbances-denies.      There were no vitals taken for this visit.      Labs:  Lab Results   Component Value Date    WBC 5.4 05/30/2024    NRBC 0.0 05/30/2024    RBC 4.69 05/30/2024    HGB 11.4 (L) 05/30/2024    HCT 36.1 05/30/2024     05/30/2024     Lab Results   Component Value Date    CALCIUM 9.0 05/30/2024    AST 26 05/30/2024    ALKPHOS 54 05/30/2024    BILITOT 0.3 05/30/2024    PROT 7.2 05/30/2024    ALBUMIN 3.9 05/30/2024     (L) 05/30/2024    K 4.4 05/30/2024     05/30/2024    CO2 21 05/30/2024    ANIONGAP 14 05/30/2024    BUN 12 05/30/2024    CREATININE 0.73 05/30/2024    UREACREAUR 17.1 10/12/2022    GLUCOSE 78 05/30/2024    ALT 10 05/30/2024    EGFR >90 05/30/2024     Lab Results   Component Value Date    CHOL 193 11/21/2024    TRIG 83 11/21/2024    HDL 61.3 11/21/2024    LDLCALC 115 (H) 11/21/2024     No results found for: \"MICROALBCREA\"  Lab Results   Component Value Date    TSH 9.30 (H) 02/06/2025     No results found for: \"[id number]\"  No results found for: \"HGBA1C\"      Physical Exam   General Appearance: pleasant, cooperative, no acute distress  HEENT: no chemosis, no proptosis, no lid lag, no lid retraction  Neck: no lymphadenopathy, no thyromegaly, no dominant thyroid nodules  Heart: no murmurs, regular rate and rhythm, S1 and S2  Lungs: no " wheezes, no rhonci, no rales  Extremities: no lower extremity swelling      Assessment/Plan   1. Hyperthyroidism (Primary)  ***         Follow Up:      -labs/tests/notes reviewed  -reviewed and counseled patient on medication monitoring and side effects               [1]   Current Outpatient Medications:     methIMAzole (Tapazole) 5 mg tablet, TAKE 1 TABLET BY MOUTH EVERY DAY, Disp: 90 tablet, Rfl: 1

## 2025-08-06 ENCOUNTER — APPOINTMENT (OUTPATIENT)
Dept: ENDOCRINOLOGY | Facility: CLINIC | Age: 35
End: 2025-08-06
Payer: MEDICAID

## 2025-08-06 DIAGNOSIS — E05.90 HYPERTHYROIDISM: Primary | ICD-10-CM
